# Patient Record
Sex: MALE | Race: WHITE | NOT HISPANIC OR LATINO | Employment: FULL TIME | URBAN - METROPOLITAN AREA
[De-identification: names, ages, dates, MRNs, and addresses within clinical notes are randomized per-mention and may not be internally consistent; named-entity substitution may affect disease eponyms.]

---

## 2022-01-21 ENCOUNTER — HOSPITAL ENCOUNTER (EMERGENCY)
Facility: CLINIC | Age: 42
Discharge: HOME | End: 2022-01-21
Attending: EMERGENCY MEDICINE | Admitting: EMERGENCY MEDICINE
Payer: COMMERCIAL

## 2022-01-21 VITALS
DIASTOLIC BLOOD PRESSURE: 91 MMHG | OXYGEN SATURATION: 96 % | WEIGHT: 220 LBS | BODY MASS INDEX: 30.8 KG/M2 | TEMPERATURE: 97.8 F | SYSTOLIC BLOOD PRESSURE: 136 MMHG | HEART RATE: 95 BPM | HEIGHT: 71 IN | RESPIRATION RATE: 10 BRPM

## 2022-01-21 DIAGNOSIS — F41.1 ANXIETY REACTION: ICD-10-CM

## 2022-01-21 DIAGNOSIS — R00.0 TACHYCARDIA: ICD-10-CM

## 2022-01-21 LAB
ANION GAP SERPL CALCULATED.3IONS-SCNC: 7 MMOL/L (ref 3–14)
ATRIAL RATE - MUSE: 122 BPM
BASOPHILS # BLD AUTO: 0.1 10E3/UL (ref 0–0.2)
BASOPHILS NFR BLD AUTO: 1 %
BUN SERPL-MCNC: 20 MG/DL (ref 7–30)
CALCIUM SERPL-MCNC: 9.1 MG/DL (ref 8.5–10.1)
CHLORIDE BLD-SCNC: 102 MMOL/L (ref 94–109)
CO2 SERPL-SCNC: 26 MMOL/L (ref 20–32)
CREAT SERPL-MCNC: 0.95 MG/DL (ref 0.66–1.25)
DIASTOLIC BLOOD PRESSURE - MUSE: NORMAL MMHG
EOSINOPHIL # BLD AUTO: 0.1 10E3/UL (ref 0–0.7)
EOSINOPHIL NFR BLD AUTO: 1 %
ERYTHROCYTE [DISTWIDTH] IN BLOOD BY AUTOMATED COUNT: 12.4 % (ref 10–15)
GFR SERPL CREATININE-BSD FRML MDRD: >90 ML/MIN/1.73M2
GLUCOSE BLD-MCNC: 116 MG/DL (ref 70–99)
HCT VFR BLD AUTO: 48.5 % (ref 40–53)
HGB BLD-MCNC: 16 G/DL (ref 13.3–17.7)
IMM GRANULOCYTES # BLD: 0 10E3/UL
IMM GRANULOCYTES NFR BLD: 0 %
INTERPRETATION ECG - MUSE: NORMAL
LYMPHOCYTES # BLD AUTO: 3.6 10E3/UL (ref 0.8–5.3)
LYMPHOCYTES NFR BLD AUTO: 36 %
MCH RBC QN AUTO: 29.7 PG (ref 26.5–33)
MCHC RBC AUTO-ENTMCNC: 33 G/DL (ref 31.5–36.5)
MCV RBC AUTO: 90 FL (ref 78–100)
MONOCYTES # BLD AUTO: 1.4 10E3/UL (ref 0–1.3)
MONOCYTES NFR BLD AUTO: 14 %
NEUTROPHILS # BLD AUTO: 4.7 10E3/UL (ref 1.6–8.3)
NEUTROPHILS NFR BLD AUTO: 48 %
NRBC # BLD AUTO: 0 10E3/UL
NRBC BLD AUTO-RTO: 0 /100
P AXIS - MUSE: 35 DEGREES
PLATELET # BLD AUTO: 290 10E3/UL (ref 150–450)
POTASSIUM BLD-SCNC: 3.8 MMOL/L (ref 3.4–5.3)
PR INTERVAL - MUSE: 156 MS
QRS DURATION - MUSE: 68 MS
QT - MUSE: 300 MS
QTC - MUSE: 427 MS
R AXIS - MUSE: 15 DEGREES
RBC # BLD AUTO: 5.39 10E6/UL (ref 4.4–5.9)
SODIUM SERPL-SCNC: 135 MMOL/L (ref 133–144)
SYSTOLIC BLOOD PRESSURE - MUSE: NORMAL MMHG
T AXIS - MUSE: 9 DEGREES
T4 FREE SERPL-MCNC: 1.02 NG/DL (ref 0.76–1.46)
TSH SERPL DL<=0.005 MIU/L-ACNC: 4.55 MU/L (ref 0.4–4)
VENTRICULAR RATE- MUSE: 122 BPM
WBC # BLD AUTO: 9.9 10E3/UL (ref 4–11)

## 2022-01-21 PROCEDURE — 85025 COMPLETE CBC W/AUTO DIFF WBC: CPT | Performed by: EMERGENCY MEDICINE

## 2022-01-21 PROCEDURE — 84439 ASSAY OF FREE THYROXINE: CPT | Performed by: EMERGENCY MEDICINE

## 2022-01-21 PROCEDURE — 84443 ASSAY THYROID STIM HORMONE: CPT | Performed by: EMERGENCY MEDICINE

## 2022-01-21 PROCEDURE — 82374 ASSAY BLOOD CARBON DIOXIDE: CPT | Performed by: EMERGENCY MEDICINE

## 2022-01-21 PROCEDURE — 99284 EMERGENCY DEPT VISIT MOD MDM: CPT

## 2022-01-21 PROCEDURE — 36415 COLL VENOUS BLD VENIPUNCTURE: CPT | Performed by: EMERGENCY MEDICINE

## 2022-01-21 PROCEDURE — 250N000011 HC RX IP 250 OP 636: Performed by: EMERGENCY MEDICINE

## 2022-01-21 RX ORDER — ONDANSETRON 4 MG/1
4 TABLET, ORALLY DISINTEGRATING ORAL ONCE
Status: COMPLETED | OUTPATIENT
Start: 2022-01-21 | End: 2022-01-21

## 2022-01-21 RX ADMIN — ONDANSETRON 4 MG: 4 TABLET, ORALLY DISINTEGRATING ORAL at 04:33

## 2022-01-21 ASSESSMENT — MIFFLIN-ST. JEOR: SCORE: 1935.04

## 2022-01-21 ASSESSMENT — ENCOUNTER SYMPTOMS
FEVER: 0
ABDOMINAL PAIN: 0
PALPITATIONS: 1
SHORTNESS OF BREATH: 0

## 2022-01-21 NOTE — ED PROVIDER NOTES
"  History   Chief Complaint:  Palpitations       HPI   Omid Silva is a 41 year old male who presents with palpitations. The patient noticed he had a rapid heart rate about 5 hours ago after lying down to go to sleep. He then took a Xanax, did breathing exercises, and was able to fall asleep, but woke up every 10-15 minutes. He reports that he has had episodes of tachycardia in the past, about every 2-3 weeks, and typically they resolve more quickly. He was previously on fluoxetine for anxiety but no longer takes this medication regularly. He does take it as needed for episodes like this. He denies chest pain, shortness of breath, and leg swelling. The patient is Covid-19 vaccinated.     Review of Systems   Constitutional: Negative for fever.   Respiratory: Negative for shortness of breath.    Cardiovascular: Positive for palpitations. Negative for chest pain and leg swelling.   Gastrointestinal: Negative for abdominal pain.   All other systems reviewed and are negative.      Allergies:  The patient does not have any allergies    Medications:  The patient is currently on no regular medications.    Past Medical History:     No other significant past medical history or family history.    Social History:  Presents alone    Physical Exam     Patient Vitals for the past 24 hrs:   BP Temp Temp src Pulse Resp SpO2 Height Weight   01/21/22 0437 -- -- -- 95 10 96 % -- --   01/21/22 0400 -- -- -- 95 23 96 % -- --   01/21/22 0309 (!) 136/91 97.8  F (36.6  C) Oral (!) 127 18 98 % 1.803 m (5' 11\") 99.8 kg (220 lb)       Physical Exam  General: Alert and cooperative with exam. Patient in mild distress. Normal mentation.  Anxious appearance  Head:  Scalp is NC/AT  Eyes:  No scleral icterus, PERRL  ENT:  The external nose and ears are normal. The oropharynx is normal and without erythema; mucus membranes are moist. Uvula midline, no evidence of deep space infection.  Neck:  Normal range of motion without rigidity.  CV:  Mildly " tachycardic rate and rhythm    No pathologic murmur   Resp:  Breath sounds are clear bilaterally    Non-labored, no retractions or accessory muscle use  GI:  Abdomen is soft, no distension, no tenderness. No peritoneal signs  MS:  No lower extremity edema   Skin:  Warm and dry, No rash or lesions noted.  Neuro: Oriented x 3. No gross motor deficits.    Emergency Department Course   ECG  ECG obtained at 0302, ECG read at 0305  Sinus tachycardia  Otherwise normal ECG  Rate 122 bpm. NY interval 156 ms. QRS duration 68 ms. QT/QTc 300/427 ms. P-R-T axes 35 15 9.     Laboratory:  Labs Ordered and Resulted from Time of ED Arrival to Time of ED Departure   BASIC METABOLIC PANEL - Abnormal       Result Value    Sodium 135      Potassium 3.8      Chloride 102      Carbon Dioxide (CO2) 26      Anion Gap 7      Urea Nitrogen 20      Creatinine 0.95      Calcium 9.1      Glucose 116 (*)     GFR Estimate >90     TSH WITH FREE T4 REFLEX - Abnormal    TSH 4.55 (*)    CBC WITH PLATELETS AND DIFFERENTIAL - Abnormal    WBC Count 9.9      RBC Count 5.39      Hemoglobin 16.0      Hematocrit 48.5      MCV 90      MCH 29.7      MCHC 33.0      RDW 12.4      Platelet Count 290      % Neutrophils 48      % Lymphocytes 36      % Monocytes 14      % Eosinophils 1      % Basophils 1      % Immature Granulocytes 0      NRBCs per 100 WBC 0      Absolute Neutrophils 4.7      Absolute Lymphocytes 3.6      Absolute Monocytes 1.4 (*)     Absolute Eosinophils 0.1      Absolute Basophils 0.1      Absolute Immature Granulocytes 0.0      Absolute NRBCs 0.0     T4 FREE - Normal    Free T4 1.02         Emergency Department Course:     Reviewed:  I reviewed nursing notes and vitals    Assessments:  0314 I obtained history and examined the patient as noted above.    0355 I rechecked the patient and explained findings.    0427 I rechecked and updated the patient.    0450 I rechecked the patient. He is feeling better and is comfortable with discharge  home.    Interventions:  0433 Ondansetron 4 mg PO    Disposition:  The patient was discharged to home.     Impression & Plan     Medical Decision Making:    Omid Silva is a 41 year old male who presents for evaluation of tachycardia.  There is history of anxiety in the past; takes Xanax as needed.  On evaluation patient is noted to have sinus tachycardia on EKG without evidence of acute ischemia, infarction, or other significant arrhythmia.  There is no signs at this point of a general medical problem causing anxiety (PE, hyperthyroidism, other metabolic derangement, infection, etc).  Tachycardia resolved without intervention.  Later did report mild nausea was provided Zofran with improvement.  No emergent cause for tachycardia could be determined.  Given patient's reported history, there is likely a strong anxiety component to his presentation today.  Did recommend close follow-up with PCP if episodes continue.  Return precautions discussed.     Diagnosis:    ICD-10-CM    1. Tachycardia  R00.0    2. Anxiety reaction  F41.1        Scribe Disclosure:  CLARENCE Omid Maida, am serving as a scribe at 3:11 AM on 1/21/2022 to document services personally performed by Daniel Connell DO based on my observations and the provider's statements to me.         Daniel Connell DO  01/21/22 [id number]

## 2022-01-21 NOTE — ED TRIAGE NOTES
"Patient presents with complaints of a rapid heart rate that began about 5 hours ago. Patient took a alprazolam about 4 hours ago to try to \"calm it down\" without any success. Patient complains of mild nausea and mild heartburn, denied chest pain, shortness of breath and dizziness.   "
44